# Patient Record
Sex: FEMALE | Race: WHITE
[De-identification: names, ages, dates, MRNs, and addresses within clinical notes are randomized per-mention and may not be internally consistent; named-entity substitution may affect disease eponyms.]

---

## 2018-02-13 ENCOUNTER — HOSPITAL ENCOUNTER (OUTPATIENT)
Dept: HOSPITAL 92 - SCSCT | Age: 48
Discharge: HOME | End: 2018-02-13
Attending: OTOLARYNGOLOGY
Payer: COMMERCIAL

## 2018-02-13 DIAGNOSIS — R22.0: Primary | ICD-10-CM

## 2018-02-13 PROCEDURE — 70482 CT ORBIT/EAR/FOSSA W/O&W/DYE: CPT

## 2018-02-13 NOTE — CT
CT OF THE INTERNAL AUDITORY CANALS WITH AND WITHOUT CONTRAST:

 

HISTORY: 

Complete left ear hearing loss x 30 years.  Evaluate for mass and swelling.

 

COMPARISON: 

None.

 

TECHNIQUE: 

A pre- and postcontrast IC/temporal bone CT is performed.  Coronal reformatted images are submitted f
or interpretation.

 

FINDINGS: 

The visualized brain parenchyma is unremarkable.

 

There is adequate aeration of the visualized paranasal sinuses.  There is no pathologic enhancement o
f the visualized brain parenchyma.

 

Right ICA/Temporal Bone:

The internal auditory canal, cochlea, vestibule, and semicircular canals have an appropriate appearan
ce and configuration.  Vestibular aqueduct is not enlarged.

 

The tegmen tympani and tegmen mastoideum are preserved.  Ossicular chain is intact.  Stapedial footpl
ate is appropriately located.  Adequate aeration of the mastoid air cells.  Interosseous septate of t
he mastoid air cells are preserved.  Tympanic membrane is unremarkable.  External auditory canal is p
atent.

 

Left ICA/Temporal Bone:

There is an enhancing mass in the left temporal bone just posterior to the external auditory canal.  
This enhancing focus is contiguous with the left jugular vein.  This defect within the left temporal 
bone measures 1.5 cm mediolateral x 1.1 cm anterior posterior.  This abnormal area of enhancement ext
ends into the cochlear promontory.  As stated above, a portion of this mass does abut the external au
ditory canal.  There is opacification of the left mastoid air cells with destruction of the interosse
ous septae.  Tegmen tympani and tegmen mastoideum appear to be preserved.  Abnormal soft tissue densi
ty medial to the ossicular chain.  The left ossicular chain appears to be intact.  Stapedial footplat
e is unremarkable.

 

There is blunting of the scutum.  A small amount of hypodensity in Prussak's space is noted.

 

IMPRESSION: 

1.  Enhancing mass along the left jugular foramen.  Findings may be due to jugular dehisience and ext
ension into the inferior left middle ear.  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
 extension into the middle ear.  MRI with and without gadolinium is recommended.  

2,  Abnormal soft tissue density medial to the left ossicular chain.

3.  Abnormal soft tissue density in the left mastoid air cells.

 

POS: [facility]

## 2018-02-21 ENCOUNTER — HOSPITAL ENCOUNTER (OUTPATIENT)
Dept: HOSPITAL 92 - SCSMRI | Age: 48
Discharge: HOME | End: 2018-02-21
Attending: OTOLARYNGOLOGY
Payer: COMMERCIAL

## 2018-02-21 DIAGNOSIS — H90.A21: Primary | ICD-10-CM

## 2018-02-21 PROCEDURE — 70553 MRI BRAIN STEM W/O & W/DYE: CPT

## 2018-02-21 PROCEDURE — A9579 GAD-BASE MR CONTRAST NOS,1ML: HCPCS

## 2018-02-21 NOTE — MRI
BRAIN MRI WITH AND WITHOUT CONTRAST

2/21/18

 

CLINICAL HISTORY:  

Hearing loss. Reference made to IC CT exam, 2/13/18.

 

FINDINGS:  

There is a high riding left jugular bulb with encroachment upon the left middle ear. This does corres
ponds to CT findings and is indicative of a dehiscent left jugular bulb. There is adjacent mastoid ai
r cell opacification which corresponds to the prior CT findings. 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
escent mastoiditis. Correlate clinically in this regard. 

 

Incidental note of a prominent cystic mass-like configuration within the partially imaged cervical sp
inal cord, incompletely evaluated. Cephalad to this finding there is prominent degenerative disc dise
ase which results in mass effect upon the upper cervical spinal cord. There is also degenerative hype
rtrophy at the atlantodental articulation with crowding of the cervicomedullary junction. Findings bingham
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

 

No significant signal abnormality of the brain parenchyma. There is no acute territorial infarction, 
mass effect or midline shift. Incidentally noted is a retention cyst formation at the medial right ma
xillary sinus. 

 

IMPRESSION:  

1.      Findings most consistent with dehiscent left jugular bulb. There is adjacent mastoid effusion
 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 Correlate cli
nically. 

2.      Incidental note of cystic mass-like prominence of the imaged cervical spinal cord at the infe
rior aspect. This finding is incompletely visualized. 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
ydrosyringomyelia with associated cord parenchymal attenuation and expansion, although the possibilit
y of a cystic intramedullary mass is not excluded. Followup with pre and postcontrast cervical spine 
MRI is warranted to further evaluate. Imaging of the thoracic spine may also be obtained concurrently
. 

3.      Additional details are described above. 

 

Telephone call of findings placed to patient's physician Florencio Adrian at 1702 hours, 2/21/18.  

 

Code CR

 

POS: [facility]